# Patient Record
Sex: MALE | Race: WHITE | NOT HISPANIC OR LATINO | Employment: FULL TIME | URBAN - METROPOLITAN AREA
[De-identification: names, ages, dates, MRNs, and addresses within clinical notes are randomized per-mention and may not be internally consistent; named-entity substitution may affect disease eponyms.]

---

## 2023-11-27 ENCOUNTER — OFFICE VISIT (OUTPATIENT)
Dept: URGENT CARE | Facility: CLINIC | Age: 53
End: 2023-11-27
Payer: COMMERCIAL

## 2023-11-27 VITALS
OXYGEN SATURATION: 97 % | DIASTOLIC BLOOD PRESSURE: 86 MMHG | BODY MASS INDEX: 36.57 KG/M2 | RESPIRATION RATE: 16 BRPM | HEART RATE: 60 BPM | SYSTOLIC BLOOD PRESSURE: 137 MMHG | WEIGHT: 270 LBS | HEIGHT: 72 IN | TEMPERATURE: 98 F

## 2023-11-27 DIAGNOSIS — J01.00 ACUTE NON-RECURRENT MAXILLARY SINUSITIS: Primary | ICD-10-CM

## 2023-11-27 PROCEDURE — 99204 OFFICE O/P NEW MOD 45 MIN: CPT | Performed by: PHYSICIAN ASSISTANT

## 2023-11-27 RX ORDER — AMOXICILLIN AND CLAVULANATE POTASSIUM 875; 125 MG/1; MG/1
1 TABLET, FILM COATED ORAL 2 TIMES DAILY
Qty: 14 TABLET | Refills: 0 | Status: SHIPPED | OUTPATIENT
Start: 2023-11-27 | End: 2023-12-04

## 2023-11-27 ASSESSMENT — ENCOUNTER SYMPTOMS
EYE PAIN: 0
COLOR CHANGE: 0
SORE THROAT: 0
SINUS PRESSURE: 1
APPETITE CHANGE: 0
EYE REDNESS: 0
FATIGUE: 1
DIARRHEA: 0
VOMITING: 0
SINUS PAIN: 1
WOUND: 0
DYSURIA: 0
NAUSEA: 0
ALLERGIC/IMMUNOLOGIC NEGATIVE: 1
RHINORRHEA: 0
PSYCHIATRIC NEGATIVE: 1
FLANK PAIN: 0
ENDOCRINE NEGATIVE: 1
ACTIVITY CHANGE: 0
COUGH: 1
NEUROLOGICAL NEGATIVE: 1
FEVER: 1
EYE DISCHARGE: 0
HEMATOLOGIC/LYMPHATIC NEGATIVE: 1
SHORTNESS OF BREATH: 0
BLOOD IN STOOL: 0
ABDOMINAL PAIN: 0
MYALGIAS: 1
WHEEZING: 0

## 2023-11-27 ASSESSMENT — PAIN SCALES - GENERAL: PAINLEVEL: 0-NO PAIN

## 2023-11-27 NOTE — PROGRESS NOTES
Subjective   Patient ID: Be Schmitz is a 53 y.o. male who presents for sinus congestion over the course of the last 5 days.  He endorses fever that is since resolved.  Denies any chest pain or shortness of breath.  Notes frontal sinus headache mostly affecting the right side.  Notes a history of chronic problems with his sinuses.  He denies any nausea vomiting diarrhea or abdominal pain.  Patient declines COVID testing today            Review of Systems   Constitutional:  Positive for fatigue and fever. Negative for activity change and appetite change.   HENT:  Positive for congestion, sinus pressure and sinus pain. Negative for rhinorrhea and sore throat.    Eyes:  Negative for pain, discharge and redness.   Respiratory:  Positive for cough. Negative for shortness of breath and wheezing.    Cardiovascular:  Negative for chest pain and leg swelling.   Gastrointestinal:  Negative for abdominal pain, blood in stool, diarrhea, nausea and vomiting.   Endocrine: Negative.    Genitourinary:  Negative for dysuria and flank pain.   Musculoskeletal:  Positive for myalgias.   Skin:  Negative for color change, rash and wound.   Allergic/Immunologic: Negative.    Neurological: Negative.    Hematological: Negative.    Psychiatric/Behavioral: Negative.         Objective   /86 (BP Location: Left arm, Patient Position: Sitting)   Pulse 60   Temp 36.7 °C (98 °F)   Resp 16   Ht 1.829 m (6')   Wt 122 kg (270 lb)   SpO2 97%   BMI 36.62 kg/m²   Physical Exam  Vitals reviewed.   Constitutional:       General: He is not in acute distress.     Appearance: Normal appearance. He is not toxic-appearing.   HENT:      Head: Normocephalic.      Comments: Tenderness in the right frontal sinus on exam     Right Ear: Tympanic membrane and ear canal normal. No tenderness. No mastoid tenderness.      Left Ear: Tympanic membrane and ear canal normal. No tenderness. No mastoid tenderness.      Nose: Congestion and rhinorrhea present.       Mouth/Throat:      Mouth: Mucous membranes are moist.      Pharynx: Oropharynx is clear. Posterior oropharyngeal erythema present.   Eyes:      Conjunctiva/sclera: Conjunctivae normal.      Pupils: Pupils are equal, round, and reactive to light.   Cardiovascular:      Rate and Rhythm: Normal rate and regular rhythm.      Heart sounds: No murmur heard.  Pulmonary:      Effort: No respiratory distress.      Breath sounds: No stridor. No wheezing, rhonchi or rales.   Chest:      Chest wall: No tenderness.   Abdominal:      Tenderness: There is no abdominal tenderness. There is no guarding.   Musculoskeletal:         General: Normal range of motion.   Skin:     General: Skin is warm and dry.      Capillary Refill: Capillary refill takes less than 2 seconds.      Findings: No erythema.   Neurological:      General: No focal deficit present.      Mental Status: He is alert.         Assessment/Plan   Problem List Items Addressed This Visit       Acute non-recurrent maxillary sinusitis - Primary    Relevant Medications    amoxicillin-pot clavulanate (Augmentin) 875-125 mg tablet   -I discussed with patient to continue symptomatic therapy over the course of the next few days.  If symptoms are failing to improve or worsen acutely I have provided him a written prescription for Augmentin for coverage.  I am recommending that he continue taking the Advil Cold and Sinus and Flonase, also recommending steam treatments, warm tea honey etc.

## 2023-12-16 ENCOUNTER — OFFICE VISIT (OUTPATIENT)
Dept: URGENT CARE | Facility: CLINIC | Age: 53
End: 2023-12-16
Payer: COMMERCIAL

## 2023-12-16 VITALS
BODY MASS INDEX: 36.62 KG/M2 | WEIGHT: 270 LBS | OXYGEN SATURATION: 97 % | TEMPERATURE: 97.9 F | HEART RATE: 66 BPM | DIASTOLIC BLOOD PRESSURE: 84 MMHG | RESPIRATION RATE: 18 BRPM | SYSTOLIC BLOOD PRESSURE: 147 MMHG

## 2023-12-16 DIAGNOSIS — J06.9 VIRAL URI WITH COUGH: Primary | ICD-10-CM

## 2023-12-16 PROCEDURE — 99213 OFFICE O/P EST LOW 20 MIN: CPT | Performed by: PHYSICIAN ASSISTANT

## 2023-12-16 ASSESSMENT — ENCOUNTER SYMPTOMS
HEADACHES: 1
MUSCULOSKELETAL NEGATIVE: 1
COUGH: 1
SHORTNESS OF BREATH: 0
GASTROINTESTINAL NEGATIVE: 1
ENDOCRINE NEGATIVE: 1
PSYCHIATRIC NEGATIVE: 1
HEMATOLOGIC/LYMPHATIC NEGATIVE: 1
SORE THROAT: 0
CARDIOVASCULAR NEGATIVE: 1
EYES NEGATIVE: 1
FEVER: 0
ALLERGIC/IMMUNOLOGIC NEGATIVE: 1

## 2023-12-16 ASSESSMENT — PAIN SCALES - GENERAL: PAINLEVEL: 0-NO PAIN

## 2023-12-16 NOTE — PATIENT INSTRUCTIONS
OTC cough/cold med as directed  Increase clear fluids  Pcp follow up this week if not improving or worsening  ER visit anytime 24/7 for acute worsening or changing condition

## 2023-12-16 NOTE — PROGRESS NOTES
Subjective   Patient ID: Be Schmitz is a 53 y.o. male.      History provided by:  Patient   used: No    Earache   Associated symptoms include coughing and headaches. Pertinent negatives include no sore throat.   This is a 53 yr old male here for respiratory sxs, HA, right ear pain, productive cough, and clear nasal congestion x 2-3 days. No fever, dyspnea or sore throat. No COVID concerns.     Review of Systems   Constitutional:  Negative for fever.   HENT:  Positive for congestion and ear pain. Negative for sore throat.    Eyes: Negative.    Respiratory:  Positive for cough. Negative for shortness of breath.    Cardiovascular: Negative.    Gastrointestinal: Negative.    Endocrine: Negative.    Genitourinary: Negative.    Musculoskeletal: Negative.    Skin: Negative.    Allergic/Immunologic: Negative.    Neurological:  Positive for headaches.   Hematological: Negative.    Psychiatric/Behavioral: Negative.     All other systems reviewed and are negative.  No past medical history on file.  No current outpatient medications on file prior to visit.     No current facility-administered medications on file prior to visit.     Allergies   Allergen Reactions    Azithromycin Hives     /84   Pulse 66   Temp 36.6 °C (97.9 °F)   Resp 18   Wt 122 kg (270 lb)   SpO2 97%   BMI 36.62 kg/m²   Objective   Physical Exam  Vitals and nursing note reviewed.   Constitutional:       Appearance: Normal appearance.   HENT:      Head: Normocephalic and atraumatic.      Right Ear: Tympanic membrane and ear canal normal.      Left Ear: Tympanic membrane and ear canal normal.      Mouth/Throat:      Mouth: Mucous membranes are moist.      Pharynx: Oropharynx is clear.   Cardiovascular:      Rate and Rhythm: Normal rate and regular rhythm.   Pulmonary:      Effort: Pulmonary effort is normal.      Breath sounds: Normal breath sounds.   Musculoskeletal:      Cervical back: Neck supple.   Lymphadenopathy:       Cervical: No cervical adenopathy.   Skin:     General: Skin is warm and dry.   Neurological:      General: No focal deficit present.      Mental Status: He is alert and oriented to person, place, and time.   Psychiatric:         Mood and Affect: Mood normal.         Behavior: Behavior normal.     Assessment:  Viral URI with cough    Plan:  No clinical indication for antibiotic rx  Pt declined cough/cold med offered  Increase clear fluids  Pcp follow up this week if not improving or worsening  ER visit anytime 24/7 for acute worsening or changing condition